# Patient Record
Sex: FEMALE | Race: WHITE | NOT HISPANIC OR LATINO | Employment: UNEMPLOYED | URBAN - METROPOLITAN AREA
[De-identification: names, ages, dates, MRNs, and addresses within clinical notes are randomized per-mention and may not be internally consistent; named-entity substitution may affect disease eponyms.]

---

## 2017-03-22 ENCOUNTER — OFFICE VISIT (OUTPATIENT)
Dept: FAMILY MEDICINE CLINIC | Facility: CLINIC | Age: 67
End: 2017-03-22

## 2017-03-22 VITALS
DIASTOLIC BLOOD PRESSURE: 60 MMHG | BODY MASS INDEX: 22.7 KG/M2 | HEIGHT: 68 IN | SYSTOLIC BLOOD PRESSURE: 108 MMHG | WEIGHT: 149.8 LBS | TEMPERATURE: 98.4 F | RESPIRATION RATE: 16 BRPM | HEART RATE: 68 BPM

## 2017-03-22 DIAGNOSIS — Z76.89 ENCOUNTER TO ESTABLISH CARE: Primary | ICD-10-CM

## 2017-03-22 DIAGNOSIS — J01.00 ACUTE NON-RECURRENT MAXILLARY SINUSITIS: ICD-10-CM

## 2017-03-22 PROCEDURE — 99203 OFFICE O/P NEW LOW 30 MIN: CPT | Performed by: NURSE PRACTITIONER

## 2017-03-22 RX ORDER — AZITHROMYCIN 250 MG/1
TABLET, FILM COATED ORAL
Qty: 6 TABLET | Refills: 0 | Status: SHIPPED | OUTPATIENT
Start: 2017-03-22

## 2017-03-22 NOTE — PROGRESS NOTES
Subjective   Nathalie Jorge is a 67 y.o. female.     History of Present Illness   NP to establish care  Sinus congestion and facial pain and pressure  Teeth pain  First started about 2 weeks ago seemed to get better then last week on Friday started feeling sx again, fatigued and sleeping a lot  Taking Zyrtec OTC  No known exposure to illness  Usually doesn't get sick, no chronic medical condition  Up to date on GYN, pap and mammogram, DEXA  She is going out of town to [place] next week and wants to get better    The following portions of the patient's history were reviewed and updated as appropriate: allergies, current medications, past family history, past medical history, past social history, past surgical history and problem list.    Review of Systems   Constitutional: Positive for chills and fatigue. Negative for fever.   HENT: Positive for congestion, ear pain, postnasal drip, rhinorrhea, sinus pressure and sore throat. Negative for sneezing.    Eyes: Negative.    Respiratory: Negative.  Negative for cough.    Cardiovascular: Negative.    Gastrointestinal: Negative.    Endocrine: Negative.    Genitourinary: Negative.    Musculoskeletal: Negative.    Skin: Negative.    Allergic/Immunologic: Negative.    Neurological: Negative.  Negative for dizziness, light-headedness and headaches.   Hematological: Negative.  Negative for adenopathy.   Psychiatric/Behavioral: Negative.  Negative for sleep disturbance.       Objective   Physical Exam   Constitutional: She is oriented to person, place, and time. Vital signs are normal. She appears well-developed and well-nourished. No distress.   HENT:   Head: Normocephalic.   Right Ear: Tympanic membrane, external ear and ear canal normal.   Left Ear: Tympanic membrane, external ear and ear canal normal.   Nose: Nose normal. No mucosal edema or rhinorrhea. Right sinus exhibits no maxillary sinus tenderness and no frontal sinus tenderness. Left sinus exhibits no maxillary sinus  tenderness and no frontal sinus tenderness.   Mouth/Throat: Uvula is midline, oropharynx is clear and moist and mucous membranes are normal. No oropharyngeal exudate.   Eyes: Conjunctivae and lids are normal. Pupils are equal, round, and reactive to light.   Neck: Trachea normal and normal range of motion. Neck supple. Carotid bruit is not present. No thyroid mass present.   Cardiovascular: Normal rate, regular rhythm, S1 normal, S2 normal, normal heart sounds and intact distal pulses.    No murmur heard.  Pulmonary/Chest: Effort normal and breath sounds normal. No respiratory distress. She has no wheezes. She has no rales.   Abdominal: Soft. Normal appearance.   Musculoskeletal: Normal range of motion.   Lymphadenopathy:        Head (right side): No tonsillar, no preauricular, no posterior auricular and no occipital adenopathy present.        Head (left side): No tonsillar, no preauricular, no posterior auricular and no occipital adenopathy present.     She has no cervical adenopathy.   Neurological: She is alert and oriented to person, place, and time.   Skin: Skin is warm, dry and intact. No rash noted. No cyanosis. Nails show no clubbing.   Psychiatric: She has a normal mood and affect. Her speech is normal and behavior is normal. Judgment and thought content normal. Cognition and memory are normal.   Nursing note and vitals reviewed.      Assessment/Plan   Nathalie was seen today for re-establish care and sinus congestion, drainage, cough.    Diagnoses and all orders for this visit:    Acute non-recurrent maxillary sinusitis    Other orders  -     azithromycin (ZITHROMAX) 250 MG tablet; Take 2 tablets the first day, then 1 tablet daily for 4 days.    Will treat with Zpak for acute sinusitis  Follow up as needed

## 2021-11-18 ENCOUNTER — OFFICE (OUTPATIENT)
Dept: URBAN - METROPOLITAN AREA PATHOLOGY 4 | Facility: PATHOLOGY | Age: 71
End: 2021-11-18

## 2021-11-18 ENCOUNTER — AMBULATORY SURGICAL CENTER (OUTPATIENT)
Dept: URBAN - METROPOLITAN AREA SURGERY 10 | Facility: SURGERY | Age: 71
End: 2021-11-18

## 2021-11-18 DIAGNOSIS — K57.30 DIVERTICULOSIS OF LARGE INTESTINE WITHOUT PERFORATION OR ABS: ICD-10-CM

## 2021-11-18 DIAGNOSIS — Z12.11 ENCOUNTER FOR SCREENING FOR MALIGNANT NEOPLASM OF COLON: ICD-10-CM

## 2021-11-18 DIAGNOSIS — K64.0 FIRST DEGREE HEMORRHOIDS: ICD-10-CM

## 2021-11-18 DIAGNOSIS — D12.2 BENIGN NEOPLASM OF ASCENDING COLON: ICD-10-CM

## 2021-11-18 PROCEDURE — 88305 TISSUE EXAM BY PATHOLOGIST: CPT | Mod: 33 | Performed by: INTERNAL MEDICINE

## 2021-11-18 PROCEDURE — 45385 COLONOSCOPY W/LESION REMOVAL: CPT | Mod: 33 | Performed by: INTERNAL MEDICINE

## 2021-11-19 PROBLEM — Z12.11 SCREENING FOR COLONIC NEOPLASIA: Status: ACTIVE | Noted: 2021-11-19

## 2024-12-02 NOTE — PROGRESS NOTES
Subjective:     Encounter Date:12/03/2024    Primary Care Physician: Cornell Pitts MD      Patient ID: Nathalie Jorge is a 74 y.o. female.    Chief Complaint:Consult (Hypertension)    PROBLEM LIST:  Palpitations  Hypertension  Dyslipidemia   2016 calcium score slightly over 500  History of MRSA  GERD  Surgeries:  Tubal ligation  Tonsillectomy  Ovarian cyst drainage  Oophorectomy  Appendectomy       No Known Allergies      Current Outpatient Medications:     ascorbic acid (VITAMIN C) 1000 MG tablet, Take  by mouth daily., Disp: , Rfl:     Calcium 200 MG tablet, , Disp: , Rfl:     carbidopa-levodopa (SINEMET)  MG per tablet, Take 1 tablet by mouth 2 (Two) Times a Day., Disp: , Rfl:     carvedilol (COREG) 3.125 MG tablet, Take 1 tablet by mouth 2 (Two) Times a Day., Disp: , Rfl:     Cholecalciferol (FT Vitamin D3) 125 MCG (5000 UT) tablet, Take 1 tablet by mouth Daily., Disp: , Rfl:     Cholecalciferol 25 MCG (1000 UT) tablet, Take 1 tablet by mouth Daily., Disp: , Rfl:     famotidine (PEPCID) 40 MG tablet, Take 1 tablet by mouth Daily., Disp: , Rfl:     l-methylfolate-algae-B6-B12 (METANX) 3-90.314-2-35 MG capsule capsule, Take 1 capsule by mouth 2 (Two) Times a Day., Disp: , Rfl:     Magnesium 400 MG capsule, Take  by mouth., Disp: , Rfl:     L-Lysine 1000 MG tablet, Take 1 tablet by mouth Daily., Disp: , Rfl:     Lactobacillus (PROBIOTIC GOLD EXTRA STRENGTH PO), Take  by mouth., Disp: , Rfl:     ZINC METHIONATE PO, Take 75 mg PE/day by mouth Daily., Disp: , Rfl:         History of Present Illness    Patient is a 74-year-old  female who we are seeing today for further evaluation of palpitations.  She has no previous history of arrhythmia or obstructive coronary disease.  She has risk factors including hypertension, dyslipidemia.  She previously had a calcium score of greater than 500.  She denies any chest pain, pressure, tightness.  However, over the last 5 to 6 months she has been having  some recurrent palpitations.  These are not described as tachycardic sensations but more of pounding sensations.  They are intermittent.  No reported associated syncope, presyncope.  She denies any edema.  However, does feel that she has been somewhat more short of breath in the last few months as well.  Given her symptoms and risk factors she was referred here for further evaluation.    The following portions of the patient's history were reviewed and updated as appropriate: allergies, current medications, past family history, past medical history, past social history, past surgical history and problem list.    Family History   Problem Relation Age of Onset    Diabetes type II Mother     No Known Problems Sister     Diabetes type II Brother        Social History     Tobacco Use    Smoking status: Former    Smokeless tobacco: Never    Tobacco comments:     Very remote and light smoking history   Substance Use Topics    Drug use: No         Review of Systems   Constitutional: Positive for malaise/fatigue. Negative for fever.   HENT:  Positive for hearing loss. Negative for nosebleeds.    Eyes:  Negative for redness and visual disturbance.   Cardiovascular:  Positive for palpitations. Negative for orthopnea and paroxysmal nocturnal dyspnea.   Respiratory:  Positive for shortness of breath. Negative for cough, snoring, sputum production and wheezing.    Endocrine: Positive for cold intolerance.   Hematologic/Lymphatic: Negative for bleeding problem.   Skin:  Negative for flushing, itching and rash.   Musculoskeletal:  Negative for falls, joint pain and muscle cramps.   Gastrointestinal:  Negative for abdominal pain, diarrhea, heartburn, nausea and vomiting.   Genitourinary:  Negative for hematuria.   Neurological:  Positive for dizziness and vertigo. Negative for excessive daytime sleepiness, headaches, tremors and weakness.   Psychiatric/Behavioral:  Negative for substance abuse. The patient is not nervous/anxious.   "         Objective:   /72   Pulse 57   Ht 171.5 cm (67.5\")   Wt 64.7 kg (142 lb 9.6 oz)   SpO2 94%   BMI 22.00 kg/m²         Vitals reviewed.   Constitutional:       Appearance: Healthy appearance. Well-developed and not in distress.   Eyes:      Conjunctiva/sclera: Conjunctivae normal.      Pupils: Pupils are equal, round, and reactive to light.   HENT:      Head: Normocephalic and atraumatic.    Mouth/Throat:      Pharynx: Oropharynx is clear.   Neck:      Thyroid: Thyroid normal. No thyromegaly.      Vascular: Normal carotid pulses. No carotid bruit or JVD. JVD normal.      Lymphadenopathy: No cervical adenopathy.   Pulmonary:      Effort: No respiratory distress.      Breath sounds: No wheezing. No rales.   Chest:      Chest wall: Not tender to palpatation.   Cardiovascular:      Normal rate. Regular rhythm.      No gallop.    Pulses:     Carotid: 2+ bilaterally.     Dorsalis pedis: 2+ bilaterally.     Posterior tibial: 2+ bilaterally.  Edema:     Peripheral edema absent.   Abdominal:      General: There is no distension or abdominal bruit.      Palpations: There is no abdominal mass.      Tenderness: There is no abdominal tenderness. There is no rebound.   Musculoskeletal:         General: No tenderness or deformity.      Extremities: No clubbing present.Skin:     General: Skin is warm and dry. There is no cyanosis.      Findings: No rash.   Neurological:      Mental Status: Alert, oriented to person, place, and time and oriented to person, place and time.           ECG 12 Lead    Date/Time: 12/3/2024 11:12 AM  Performed by: Francisco Bueno MD    Authorized by: Francisco Bueno MD  Comparison: compared with previous ECG from 5/19/2016  Rhythm: sinus bradycardia  Ectopy: infrequent PVCs  Conduction: left anterior fascicular block                Assessment:   Assessment & Plan      Diagnoses and all orders for this visit:    1. Palpitations (Primary)  -     ECG 12 Lead      1.  Palpitations, " consistent with PVCs.  2.  Coronary artery disease with severe the elevated coronary calcium score of greater than 500 8 years ago.  3.  Dyslipidemia currently untreated, patient preference.  4.  Hypertension well-controlled on carvedilol  5.  Exertional dyspnea, functional class II.  6.  Conduction system disease.  Left anterior fascicular block, and borderline incomplete right bundle branch block, both new from previous EKGs.    Recommendations:  1.  Check 48-hour Holter monitor to rule out heart block or other arrhythmias.  2.  Long discussion regarding benign nature of PVCs if structurally normal heart.  3.  Patient to see primary care physician next week.  Will have them check thyroid function test, BMP and magnesium.  4.  Check echocardiogram  5.  Exercise myocardial perfusion study to rule out ischemia  6.  If the above testing is normal, discussed the benign nature of PVCs, and simple reassurance is all that is required.  7.  Discussed possibility of statin, she wishes to defer.  Will we discussed, if she has ischemic heart disease.  8.  Further recommendations after the above       Suzanne BAUTISTA scribed portions of this dictation for Dr. Francisco Bueno.     Advance Care Planning   ACP discussion was held with the patient during this visit. Patient has an advance directive (not in EMR), copy requested.      I have seen and examined the patient, I have reviewed the note, discussed the case with the advance practice clinician, made necessary changes and I agree with the final note.    Francisco Bueno MD  12/03/24  11:49 EST      Dictated utilizing Dragon dictation

## 2024-12-03 ENCOUNTER — OFFICE VISIT (OUTPATIENT)
Dept: CARDIOLOGY | Facility: CLINIC | Age: 74
End: 2024-12-03
Payer: MEDICARE

## 2024-12-03 VITALS
HEIGHT: 68 IN | DIASTOLIC BLOOD PRESSURE: 72 MMHG | HEART RATE: 57 BPM | WEIGHT: 142.6 LBS | SYSTOLIC BLOOD PRESSURE: 119 MMHG | BODY MASS INDEX: 21.61 KG/M2 | OXYGEN SATURATION: 94 %

## 2024-12-03 DIAGNOSIS — R00.2 PALPITATIONS: Primary | ICD-10-CM

## 2024-12-03 DIAGNOSIS — I49.3 PVC (PREMATURE VENTRICULAR CONTRACTION): ICD-10-CM

## 2024-12-03 PROBLEM — D50.9 IRON DEFICIENCY ANEMIA: Status: ACTIVE | Noted: 2024-12-03

## 2024-12-03 PROBLEM — E78.5 HYPERLIPIDEMIA: Status: ACTIVE | Noted: 2024-12-03

## 2024-12-03 PROBLEM — G60.9 IDIOPATHIC PERIPHERAL NEUROPATHY: Status: ACTIVE | Noted: 2022-05-03

## 2024-12-03 PROBLEM — G25.81 RESTLESS LEGS SYNDROME (RLS): Status: ACTIVE | Noted: 2022-05-03

## 2024-12-03 PROBLEM — I10 ESSENTIAL HYPERTENSION: Status: ACTIVE | Noted: 2024-12-03

## 2024-12-03 PROBLEM — G24.9 DYSTONIA: Status: ACTIVE | Noted: 2022-08-02

## 2024-12-03 PROBLEM — G62.9 NEUROPATHY: Status: ACTIVE | Noted: 2022-10-19

## 2024-12-03 PROBLEM — G62.9 POLYNEUROPATHY: Status: ACTIVE | Noted: 2024-12-03

## 2024-12-03 PROBLEM — H91.90 CHANGE IN HEARING: Status: ACTIVE | Noted: 2024-12-03

## 2024-12-03 RX ORDER — FAMOTIDINE 40 MG/1
40 TABLET, FILM COATED ORAL DAILY
COMMUNITY
Start: 2023-01-01

## 2024-12-03 RX ORDER — CARVEDILOL 3.12 MG/1
1 TABLET ORAL 2 TIMES DAILY
COMMUNITY

## 2024-12-03 RX ORDER — L-METHYLFOLATE-ALGAE-VIT B12-B6 CAP 3-90.314-2-35 MG 3-90.314-2-35 MG
1 CAP ORAL 2 TIMES DAILY
COMMUNITY
Start: 2023-01-01

## 2024-12-03 RX ORDER — CARBIDOPA AND LEVODOPA 25; 100 MG/1; MG/1
1 TABLET ORAL 2 TIMES DAILY
COMMUNITY

## 2024-12-03 RX ORDER — CHOLECALCIFEROL (VITAMIN D3) 25 MCG
1000 TABLET ORAL DAILY
COMMUNITY

## 2024-12-03 RX ORDER — VIT C/E/ZN/COPPR/LUTEIN/ZEAXAN 250MG-90MG
125 CAPSULE ORAL DAILY
COMMUNITY
Start: 2004-01-01

## 2024-12-10 ENCOUNTER — HOSPITAL ENCOUNTER (OUTPATIENT)
Dept: CARDIOLOGY | Facility: HOSPITAL | Age: 74
Discharge: HOME | End: 2024-12-10
Payer: MEDICARE

## 2024-12-10 VITALS
DIASTOLIC BLOOD PRESSURE: 60 MMHG | WEIGHT: 142.64 LBS | BODY MASS INDEX: 21.62 KG/M2 | HEIGHT: 68 IN | SYSTOLIC BLOOD PRESSURE: 117 MMHG

## 2024-12-10 DIAGNOSIS — I49.3 PVC (PREMATURE VENTRICULAR CONTRACTION): ICD-10-CM

## 2024-12-10 DIAGNOSIS — R00.2 PALPITATIONS: ICD-10-CM

## 2024-12-10 LAB
AORTIC DIMENSIONLESS INDEX: 0.84 (DI)
ASCENDING AORTA: 3.5 CM
BH CV ECHO MEAS - AO MAX PG: 6.8 MMHG
BH CV ECHO MEAS - AO MEAN PG: 4 MMHG
BH CV ECHO MEAS - AO ROOT DIAM: 3.4 CM
BH CV ECHO MEAS - AO V2 MAX: 130.6 CM/SEC
BH CV ECHO MEAS - AO V2 VTI: 28.1 CM
BH CV ECHO MEAS - AVA(I,D): 2.6 CM2
BH CV ECHO MEAS - EF(MOD-BP): 70.5 %
BH CV ECHO MEAS - IVS/LVPW: 1 CM
BH CV ECHO MEAS - IVSD: 0.7 CM
BH CV ECHO MEAS - LA DIMENSION: 3.1 CM
BH CV ECHO MEAS - LV MAX PG: 4.2 MMHG
BH CV ECHO MEAS - LV MEAN PG: 1.78 MMHG
BH CV ECHO MEAS - LV V1 MAX: 102.7 CM/SEC
BH CV ECHO MEAS - LV V1 VTI: 22.5 CM
BH CV ECHO MEAS - LVIDD: 5.1 CM
BH CV ECHO MEAS - LVIDS: 3.5 CM
BH CV ECHO MEAS - LVOT DIAM: 2 CM
BH CV ECHO MEAS - LVPWD: 0.7 CM
BH CV ECHO MEAS - MV A MAX VEL: 81.8 CM/SEC
BH CV ECHO MEAS - MV E MAX VEL: 70.7 CM/SEC
BH CV ECHO MEAS - MV E/A: 0.86
BH CV ECHO MEAS - MV MAX PG: 2.9 MMHG
BH CV ECHO MEAS - MV MEAN PG: 1.5 MMHG
BH CV ECHO MEAS - MV P1/2T: 125 MSEC
BH CV ECHO MEAS - MV V2 VTI: 30.7 CM
BH CV ECHO MEAS - MVA(P1/2T): 1.8 CM2
BH CV ECHO MEAS - PA ACC TIME: 0.13 SEC
BH CV ECHO MEAS - PA V2 MAX: 96.5 CM/SEC
BH CV ECHO MEAS - RAP SYSTOLE: 3 MMHG
BH CV ECHO MEAS - RVSP: 26 MMHG
BH CV ECHO MEAS - TR MAX PG: 22.5 MMHG
BH CV ECHO MEAS - TR MAX VEL: 237 CM/SEC
BH CV REST NUCLEAR ISOTOPE DOSE: 9 MCI
BH CV STRESS BP STAGE 1: NORMAL
BH CV STRESS BP STAGE 2: NORMAL
BH CV STRESS BP STAGE 3: NORMAL
BH CV STRESS DURATION MIN STAGE 1: 3
BH CV STRESS DURATION MIN STAGE 2: 3
BH CV STRESS DURATION MIN STAGE 3: 3
BH CV STRESS DURATION MIN STAGE 4: 1
BH CV STRESS DURATION SEC STAGE 1: 0
BH CV STRESS DURATION SEC STAGE 2: 0
BH CV STRESS DURATION SEC STAGE 3: 0
BH CV STRESS DURATION SEC STAGE 4: 15
BH CV STRESS GRADE STAGE 1: 10
BH CV STRESS GRADE STAGE 2: 12
BH CV STRESS GRADE STAGE 3: 14
BH CV STRESS GRADE STAGE 4: 16
BH CV STRESS HR STAGE 1: 86
BH CV STRESS HR STAGE 2: 100
BH CV STRESS HR STAGE 3: 121
BH CV STRESS HR STAGE 4: 131
BH CV STRESS METS STAGE 1: 5
BH CV STRESS METS STAGE 2: 7.5
BH CV STRESS METS STAGE 3: 10
BH CV STRESS METS STAGE 4: 13.5
BH CV STRESS NUCLEAR ISOTOPE DOSE: 30.8 MCI
BH CV STRESS O2 STAGE 1: 100
BH CV STRESS O2 STAGE 2: 96
BH CV STRESS O2 STAGE 3: 97
BH CV STRESS O2 STAGE 4: 97
BH CV STRESS PROTOCOL 1: NORMAL
BH CV STRESS RECOVERY BP: NORMAL MMHG
BH CV STRESS RECOVERY HR: 72 BPM
BH CV STRESS RECOVERY O2: 100 %
BH CV STRESS SPEED STAGE 1: 1.7
BH CV STRESS SPEED STAGE 2: 2.5
BH CV STRESS SPEED STAGE 3: 3.4
BH CV STRESS SPEED STAGE 4: 4.2
BH CV STRESS STAGE 1: 1
BH CV STRESS STAGE 2: 2
BH CV STRESS STAGE 3: 3
BH CV STRESS STAGE 4: 4
BH CV VAS BP RIGHT ARM: NORMAL MMHG
LEFT ATRIUM VOLUME INDEX: 28.8 ML/M2
LV EF 2D ECHO EST: 65 %
LV EF NUC BP: 86 %
MAXIMAL PREDICTED HEART RATE: 148 BPM
PERCENT MAX PREDICTED HR: 88.51 %
STRESS BASELINE BP: NORMAL MMHG
STRESS BASELINE HR: 63 BPM
STRESS O2 SAT REST: 99 %
STRESS PERCENT HR: 104 %
STRESS POST ESTIMATED WORKLOAD: 11.3 METS
STRESS POST EXERCISE DUR MIN: 10 MIN
STRESS POST EXERCISE DUR SEC: 15 SEC
STRESS POST O2 SAT PEAK: 99 %
STRESS POST PEAK BP: NORMAL MMHG
STRESS POST PEAK HR: 131 BPM
STRESS TARGET HR: 126 BPM

## 2024-12-10 PROCEDURE — A9500 TC99M SESTAMIBI: HCPCS | Performed by: INTERNAL MEDICINE

## 2024-12-10 PROCEDURE — 78452 HT MUSCLE IMAGE SPECT MULT: CPT

## 2024-12-10 PROCEDURE — 93306 TTE W/DOPPLER COMPLETE: CPT

## 2024-12-10 PROCEDURE — 34310000005 TECHNETIUM SESTAMIBI: Performed by: INTERNAL MEDICINE

## 2024-12-10 PROCEDURE — 93017 CV STRESS TEST TRACING ONLY: CPT

## 2024-12-10 RX ADMIN — TECHNETIUM TC 99M SESTAMIBI 1 DOSE: 1 INJECTION INTRAVENOUS at 10:20

## 2024-12-10 RX ADMIN — TECHNETIUM TC 99M SESTAMIBI 1 DOSE: 1 INJECTION INTRAVENOUS at 08:35

## 2025-06-02 NOTE — PROGRESS NOTES
Subjective:     Encounter Date:06/03/2025    Primary Care Physician: Cornell Pitts MD      Patient ID: Nathalie Jorge is a 75 y.o. female.    Chief Complaint:Follow-up (Stress test )    PROBLEM LIST:  Palpitations  12/2024 Holter monitor occasional PVCs, 2 morphologies.  1 brief atrial tach.  Hypertension  Dyslipidemia   2016 calcium score slightly over 500  History of MRSA  GERD  Surgeries:  Tubal ligation  Tonsillectomy  Ovarian cyst drainage  Oophorectomy  Appendectomy       No Known Allergies      Current Outpatient Medications:     ascorbic acid (VITAMIN C) 1000 MG tablet, Take  by mouth daily., Disp: , Rfl:     Calcium 200 MG tablet, , Disp: , Rfl:     carbidopa-levodopa (SINEMET)  MG per tablet, Take 1 tablet by mouth 2 (Two) Times a Day., Disp: , Rfl:     carvedilol (COREG) 3.125 MG tablet, Take 1 tablet by mouth 2 (Two) Times a Day., Disp: , Rfl:     Cholecalciferol (FT Vitamin D3) 125 MCG (5000 UT) tablet, Take 1 tablet by mouth Daily., Disp: , Rfl:     Cholecalciferol 25 MCG (1000 UT) tablet, Take 1 tablet by mouth Daily., Disp: , Rfl:     famotidine (PEPCID) 40 MG tablet, Take 1 tablet by mouth Daily., Disp: , Rfl:     L-Lysine 1000 MG tablet, Take 1 tablet by mouth Daily., Disp: , Rfl:     l-methylfolate-algae-B6-B12 (METANX) 3-90.314-2-35 MG capsule capsule, Take 1 capsule by mouth 2 (Two) Times a Day., Disp: , Rfl:     Lactobacillus (PROBIOTIC GOLD EXTRA STRENGTH PO), Take  by mouth., Disp: , Rfl:     Magnesium 400 MG capsule, Take  by mouth., Disp: , Rfl:     ZINC METHIONATE PO, Take 75 mg PE/day by mouth Daily., Disp: , Rfl:         History of Present Illness    Patient is a 75-year-old  female who presents today for follow-up after Holter monitor performed in December.  Since being seen she still notes intermittent episodes of significant fatigue.  Notes that by her Fitbit her heart rate will drop into the 30s at times.  No reported chest pain, pressure.  She underwent  "further testing with overall normal myocardial perfusion study and normal echocardiogram.  Holter monitor showed PVCs.  She was recently seen by her primary care provider and is currently wearing a 2-week event monitor.  Predominantly her symptoms occur at rest.    The following portions of the patient's history were reviewed and updated as appropriate: allergies, current medications, past family history, past medical history, past social history, past surgical history and problem list.      Social History     Tobacco Use    Smoking status: Former    Smokeless tobacco: Never    Tobacco comments:     Very remote and light smoking history   Substance Use Topics    Drug use: No         ROS       Objective:   /67   Pulse 67   Ht 170.2 cm (67\")   Wt 63.9 kg (140 lb 12.8 oz)   SpO2 93%   BMI 22.05 kg/m²         Vitals reviewed.   Constitutional:       Appearance: Not in distress.   Pulmonary:      Effort: Pulmonary effort is normal.   Cardiovascular:      Normal rate.   Edema:     Peripheral edema absent.   Skin:     General: Skin is warm and dry.   Neurological:      Mental Status: Alert and oriented to person, place and time.         Procedures          Assessment:   Assessment & Plan      Diagnoses and all orders for this visit:    1. PVC (premature ventricular contraction) (Primary), suspect that these are the cause of the patient's symptoms.  As well as most likely falsely low heart rate readings on her Fitbit.  Currently wearing 2-week monitor ordered by PCP.      Educated patient regarding PVCs and potential for this to present falsely low heart rates on her Fitbit.  Educated patient on why her symptoms predominantly occur at rest.  Discussed overall benign nature of PVCs given essentially normal echo and myocardial perfusion study.  Discussed future potential treatment options including flecainide versus electrophysiology evaluation.  At this time, as her PVCs are benign in nature she wishes just to " continue her current medications.  Educated patient regarding potential triggers for PVCs.  She will contact us if her current monitor shows any significant bradycardic rhythms or other abnormalities than what her previous monitor showed.  As she wishes to defer further medical management we will have her follow-up with us on an as-needed basis.       Suzanne BAUTISTA     Advance Care Planning   ACP discussion was held with the patient during this visit. Patient has an advance directive (not in EMR), copy requested.        Dictated utilizing Dragon dictation

## 2025-06-03 ENCOUNTER — OFFICE VISIT (OUTPATIENT)
Dept: CARDIOLOGY | Facility: CLINIC | Age: 75
End: 2025-06-03
Payer: MEDICARE

## 2025-06-03 VITALS
BODY MASS INDEX: 22.1 KG/M2 | DIASTOLIC BLOOD PRESSURE: 67 MMHG | OXYGEN SATURATION: 93 % | SYSTOLIC BLOOD PRESSURE: 127 MMHG | HEIGHT: 67 IN | HEART RATE: 67 BPM | WEIGHT: 140.8 LBS

## 2025-06-03 DIAGNOSIS — I49.3 PVC (PREMATURE VENTRICULAR CONTRACTION): Primary | ICD-10-CM

## 2025-06-03 PROCEDURE — 1159F MED LIST DOCD IN RCRD: CPT | Performed by: NURSE PRACTITIONER

## 2025-06-03 PROCEDURE — 1160F RVW MEDS BY RX/DR IN RCRD: CPT | Performed by: NURSE PRACTITIONER

## 2025-06-03 PROCEDURE — 3078F DIAST BP <80 MM HG: CPT | Performed by: NURSE PRACTITIONER

## 2025-06-03 PROCEDURE — 99213 OFFICE O/P EST LOW 20 MIN: CPT | Performed by: NURSE PRACTITIONER

## 2025-06-03 PROCEDURE — 3074F SYST BP LT 130 MM HG: CPT | Performed by: NURSE PRACTITIONER

## 2025-06-03 NOTE — LETTER
Priscilla 3, 2025     Cornell Pitts MD  [address]  [place]  [facility] [address]    Patient: Nathalie Jorge   YOB: 1950   Date of Visit: 6/3/2025     Dear Cornell Pitts MD:       Thank you for referring Nathalie Jorge to me for evaluation. Below are the relevant portions of my assessment and plan of care.    If you have questions, please do not hesitate to call me. I look forward to following Nathalie along with you.         Sincerely,        MICHELE Cordon        CC: No Recipients    Suzanne Leahy APRN  06/03/25 1501  Sign when Signing Visit  Subjective:     Encounter Date:06/03/2025    Primary Care Physician: Cornell Pitts MD      Patient ID: Nathalie Jorge is a 75 y.o. female.    Chief Complaint:Follow-up (Stress test )    PROBLEM LIST:  Palpitations  12/2024 Holter monitor occasional PVCs, 2 morphologies.  1 brief atrial tach.  Hypertension  Dyslipidemia   2016 calcium score slightly over 500  History of MRSA  GERD  Surgeries:  Tubal ligation  Tonsillectomy  Ovarian cyst drainage  Oophorectomy  Appendectomy       No Known Allergies      Current Outpatient Medications:   •  ascorbic acid (VITAMIN C) 1000 MG tablet, Take  by mouth daily., Disp: , Rfl:   •  Calcium 200 MG tablet, , Disp: , Rfl:   •  carbidopa-levodopa (SINEMET)  MG per tablet, Take 1 tablet by mouth 2 (Two) Times a Day., Disp: , Rfl:   •  carvedilol (COREG) 3.125 MG tablet, Take 1 tablet by mouth 2 (Two) Times a Day., Disp: , Rfl:   •  Cholecalciferol (FT Vitamin D3) 125 MCG (5000 UT) tablet, Take 1 tablet by mouth Daily., Disp: , Rfl:   •  Cholecalciferol 25 MCG (1000 UT) tablet, Take 1 tablet by mouth Daily., Disp: , Rfl:   •  famotidine (PEPCID) 40 MG tablet, Take 1 tablet by mouth Daily., Disp: , Rfl:   •  L-Lysine 1000 MG tablet, Take 1 tablet by mouth Daily., Disp: , Rfl:   •  l-methylfolate-algae-B6-B12 (METANX) 3-90.314-2-35 MG capsule capsule, Take 1 capsule by mouth 2 (Two) Times a Day.,  "Disp: , Rfl:   •  Lactobacillus (PROBIOTIC GOLD EXTRA STRENGTH PO), Take  by mouth., Disp: , Rfl:   •  Magnesium 400 MG capsule, Take  by mouth., Disp: , Rfl:   •  ZINC METHIONATE PO, Take 75 mg PE/day by mouth Daily., Disp: , Rfl:         History of Present Illness    Patient is a 75-year-old  female who presents today for follow-up after Holter monitor performed in December.  Since being seen she still notes intermittent episodes of significant fatigue.  Notes that by her Fitbit her heart rate will drop into the 30s at times.  No reported chest pain, pressure.  She underwent further testing with overall normal myocardial perfusion study and normal echocardiogram.  Holter monitor showed PVCs.  She was recently seen by her primary care provider and is currently wearing a 2-week event monitor.  Predominantly her symptoms occur at rest.    The following portions of the patient's history were reviewed and updated as appropriate: allergies, current medications, past family history, past medical history, past social history, past surgical history and problem list.      Social History     Tobacco Use   • Smoking status: Former   • Smokeless tobacco: Never   • Tobacco comments:     Very remote and light smoking history   Substance Use Topics   • Drug use: No         ROS       Objective:   /67   Pulse 67   Ht 170.2 cm (67\")   Wt 63.9 kg (140 lb 12.8 oz)   SpO2 93%   BMI 22.05 kg/m²         Vitals reviewed.   Constitutional:       Appearance: Not in distress.   Pulmonary:      Effort: Pulmonary effort is normal.   Cardiovascular:      Normal rate.   Edema:     Peripheral edema absent.   Skin:     General: Skin is warm and dry.   Neurological:      Mental Status: Alert and oriented to person, place and time.         Procedures          Assessment:   Assessment & Plan     Diagnoses and all orders for this visit:    1. PVC (premature ventricular contraction) (Primary), suspect that these are the cause of the " patient's symptoms.  As well as most likely falsely low heart rate readings on her Fitbit.  Currently wearing 2-week monitor ordered by PCP.      Educated patient regarding PVCs and potential for this to present falsely low heart rates on her Fitbit.  Educated patient on why her symptoms predominantly occur at rest.  Discussed overall benign nature of PVCs given essentially normal echo and myocardial perfusion study.  Discussed future potential treatment options including flecainide versus electrophysiology evaluation.  At this time, as her PVCs are benign in nature she wishes just to continue her current medications.  Educated patient regarding potential triggers for PVCs.  She will contact us if her current monitor shows any significant bradycardic rhythms or other abnormalities than what her previous monitor showed.  As she wishes to defer further medical management we will have her follow-up with us on an as-needed basis.       Suzanne BAUTISTA     Advance Care Planning  ACP discussion was held with the patient during this visit. Patient has an advance directive (not in EMR), copy requested.        Dictated utilizing Dragon dictation